# Patient Record
Sex: FEMALE | Race: OTHER | HISPANIC OR LATINO | Employment: UNEMPLOYED | URBAN - METROPOLITAN AREA
[De-identification: names, ages, dates, MRNs, and addresses within clinical notes are randomized per-mention and may not be internally consistent; named-entity substitution may affect disease eponyms.]

---

## 2024-10-27 ENCOUNTER — HOSPITAL ENCOUNTER (EMERGENCY)
Facility: OTHER | Age: 34
Discharge: HOME | End: 2024-10-27
Attending: EMERGENCY MEDICINE

## 2024-10-27 VITALS
OXYGEN SATURATION: 96 % | SYSTOLIC BLOOD PRESSURE: 100 MMHG | TEMPERATURE: 98 F | RESPIRATION RATE: 18 BRPM | DIASTOLIC BLOOD PRESSURE: 59 MMHG | HEART RATE: 61 BPM

## 2024-10-27 DIAGNOSIS — O21.9 NAUSEA/VOMITING IN PREGNANCY: ICD-10-CM

## 2024-10-27 DIAGNOSIS — R10.9 ABDOMINAL PAIN AFFECTING PREGNANCY: ICD-10-CM

## 2024-10-27 DIAGNOSIS — O26.899 ABDOMINAL PAIN AFFECTING PREGNANCY: ICD-10-CM

## 2024-10-27 DIAGNOSIS — R55 NEAR SYNCOPE: Primary | ICD-10-CM

## 2024-10-27 LAB
ABO + RH BLD: NORMAL
ALBUMIN SERPL BCP-MCNC: 4.1 G/DL (ref 3.5–5.2)
ALP SERPL-CCNC: 65 U/L (ref 40–150)
ALT SERPL W/O P-5'-P-CCNC: 11 U/L (ref 10–44)
ANION GAP SERPL CALC-SCNC: 10 MMOL/L (ref 8–16)
AST SERPL-CCNC: 14 U/L (ref 10–40)
B-HCG UR QL: POSITIVE
BASOPHILS # BLD AUTO: 0.02 K/UL (ref 0–0.2)
BASOPHILS NFR BLD: 0.2 % (ref 0–1.9)
BILIRUB SERPL-MCNC: 0.3 MG/DL (ref 0.1–1)
BUN SERPL-MCNC: 7 MG/DL (ref 6–20)
CALCIUM SERPL-MCNC: 9.9 MG/DL (ref 8.7–10.5)
CHLORIDE SERPL-SCNC: 106 MMOL/L (ref 95–110)
CO2 SERPL-SCNC: 20 MMOL/L (ref 23–29)
CREAT SERPL-MCNC: 0.7 MG/DL (ref 0.5–1.4)
CTP QC/QA: YES
DIFFERENTIAL METHOD BLD: ABNORMAL
EOSINOPHIL # BLD AUTO: 0 K/UL (ref 0–0.5)
EOSINOPHIL NFR BLD: 0.3 % (ref 0–8)
ERYTHROCYTE [DISTWIDTH] IN BLOOD BY AUTOMATED COUNT: 13.9 % (ref 11.5–14.5)
EST. GFR  (NO RACE VARIABLE): >60 ML/MIN/1.73 M^2
GLUCOSE SERPL-MCNC: 79 MG/DL (ref 70–110)
HCG INTACT+B SERPL-ACNC: NORMAL MIU/ML
HCT VFR BLD AUTO: 40.2 % (ref 37–48.5)
HGB BLD-MCNC: 13.4 G/DL (ref 12–16)
IMM GRANULOCYTES # BLD AUTO: 0.04 K/UL (ref 0–0.04)
IMM GRANULOCYTES NFR BLD AUTO: 0.4 % (ref 0–0.5)
LYMPHOCYTES # BLD AUTO: 1.4 K/UL (ref 1–4.8)
LYMPHOCYTES NFR BLD: 13.9 % (ref 18–48)
MCH RBC QN AUTO: 27.8 PG (ref 27–31)
MCHC RBC AUTO-ENTMCNC: 33.3 G/DL (ref 32–36)
MCV RBC AUTO: 83 FL (ref 82–98)
MONOCYTES # BLD AUTO: 0.7 K/UL (ref 0.3–1)
MONOCYTES NFR BLD: 6.6 % (ref 4–15)
NEUTROPHILS # BLD AUTO: 7.8 K/UL (ref 1.8–7.7)
NEUTROPHILS NFR BLD: 78.6 % (ref 38–73)
NRBC BLD-RTO: 0 /100 WBC
PLATELET # BLD AUTO: 190 K/UL (ref 150–450)
PMV BLD AUTO: 10.9 FL (ref 9.2–12.9)
POTASSIUM SERPL-SCNC: 4.4 MMOL/L (ref 3.5–5.1)
PROT SERPL-MCNC: 7.8 G/DL (ref 6–8.4)
RBC # BLD AUTO: 4.82 M/UL (ref 4–5.4)
SODIUM SERPL-SCNC: 136 MMOL/L (ref 136–145)
WBC # BLD AUTO: 9.92 K/UL (ref 3.9–12.7)

## 2024-10-27 PROCEDURE — 93010 ELECTROCARDIOGRAM REPORT: CPT | Mod: ,,, | Performed by: INTERNAL MEDICINE

## 2024-10-27 PROCEDURE — 81025 URINE PREGNANCY TEST: CPT | Performed by: NURSE PRACTITIONER

## 2024-10-27 PROCEDURE — 93005 ELECTROCARDIOGRAM TRACING: CPT

## 2024-10-27 PROCEDURE — 86901 BLOOD TYPING SEROLOGIC RH(D): CPT | Performed by: NURSE PRACTITIONER

## 2024-10-27 PROCEDURE — 85025 COMPLETE CBC W/AUTO DIFF WBC: CPT | Performed by: NURSE PRACTITIONER

## 2024-10-27 PROCEDURE — 99285 EMERGENCY DEPT VISIT HI MDM: CPT | Mod: 25

## 2024-10-27 PROCEDURE — 80053 COMPREHEN METABOLIC PANEL: CPT | Performed by: NURSE PRACTITIONER

## 2024-10-27 PROCEDURE — 86900 BLOOD TYPING SEROLOGIC ABO: CPT | Performed by: NURSE PRACTITIONER

## 2024-10-27 PROCEDURE — 84702 CHORIONIC GONADOTROPIN TEST: CPT | Performed by: NURSE PRACTITIONER

## 2024-10-27 PROCEDURE — 25000003 PHARM REV CODE 250: Performed by: NURSE PRACTITIONER

## 2024-10-27 RX ORDER — METOCLOPRAMIDE 10 MG/1
10 TABLET ORAL EVERY 6 HOURS
Qty: 30 TABLET | Refills: 0 | Status: SHIPPED | OUTPATIENT
Start: 2024-10-27

## 2024-10-27 RX ORDER — PROMETHAZINE HYDROCHLORIDE 25 MG/1
25 SUPPOSITORY RECTAL EVERY 6 HOURS PRN
Qty: 10 SUPPOSITORY | Refills: 0 | Status: SHIPPED | OUTPATIENT
Start: 2024-10-27

## 2024-10-27 RX ORDER — PYRIDOXINE HCL (VITAMIN B6) 25 MG
25 TABLET ORAL 2 TIMES DAILY
Qty: 20 TABLET | Refills: 0 | Status: SHIPPED | OUTPATIENT
Start: 2024-10-27

## 2024-10-27 RX ORDER — METOCLOPRAMIDE 10 MG/1
10 TABLET ORAL
Status: COMPLETED | OUTPATIENT
Start: 2024-10-27 | End: 2024-10-27

## 2024-10-27 RX ORDER — DOCUSATE SODIUM 100 MG
600 CAPSULE ORAL
Status: COMPLETED | OUTPATIENT
Start: 2024-10-27 | End: 2024-10-27

## 2024-10-27 RX ADMIN — METOCLOPRAMIDE 10 MG: 10 TABLET ORAL at 03:10

## 2024-10-27 RX ADMIN — Medication 600 ML: at 03:10

## 2024-10-28 LAB
OHS QRS DURATION: 82 MS
OHS QTC CALCULATION: 409 MS

## 2024-12-14 ENCOUNTER — HOSPITAL ENCOUNTER (EMERGENCY)
Facility: OTHER | Age: 34
Discharge: HOME | End: 2024-12-14
Attending: OBSTETRICS & GYNECOLOGY

## 2024-12-14 VITALS
TEMPERATURE: 98 F | RESPIRATION RATE: 17 BRPM | HEART RATE: 73 BPM | SYSTOLIC BLOOD PRESSURE: 92 MMHG | DIASTOLIC BLOOD PRESSURE: 54 MMHG | OXYGEN SATURATION: 99 %

## 2024-12-14 DIAGNOSIS — O26.892 HEARTBURN DURING PREGNANCY, SECOND TRIMESTER: ICD-10-CM

## 2024-12-14 DIAGNOSIS — R51.9 NONINTRACTABLE HEADACHE, UNSPECIFIED CHRONICITY PATTERN, UNSPECIFIED HEADACHE TYPE: Primary | ICD-10-CM

## 2024-12-14 DIAGNOSIS — R11.0 NAUSEA: ICD-10-CM

## 2024-12-14 DIAGNOSIS — Z3A.16 16 WEEKS GESTATION OF PREGNANCY: ICD-10-CM

## 2024-12-14 DIAGNOSIS — R12 HEARTBURN DURING PREGNANCY, SECOND TRIMESTER: ICD-10-CM

## 2024-12-14 LAB
ABO + RH BLD: NORMAL
ALBUMIN SERPL BCP-MCNC: 3.6 G/DL (ref 3.5–5.2)
ALP SERPL-CCNC: 55 U/L (ref 40–150)
ALT SERPL W/O P-5'-P-CCNC: 14 U/L (ref 10–44)
ANION GAP SERPL CALC-SCNC: 10 MMOL/L (ref 8–16)
AST SERPL-CCNC: 16 U/L (ref 10–40)
BASOPHILS # BLD AUTO: 0.02 K/UL (ref 0–0.2)
BASOPHILS NFR BLD: 0.2 % (ref 0–1.9)
BILIRUB SERPL-MCNC: 0.3 MG/DL (ref 0.1–1)
BILIRUBIN, POC UA: NEGATIVE
BLD GP AB SCN CELLS X3 SERPL QL: NORMAL
BLOOD, POC UA: ABNORMAL
BUN SERPL-MCNC: 8 MG/DL (ref 6–20)
CALCIUM SERPL-MCNC: 9.5 MG/DL (ref 8.7–10.5)
CHLORIDE SERPL-SCNC: 107 MMOL/L (ref 95–110)
CLARITY, UA: CLEAR
CO2 SERPL-SCNC: 18 MMOL/L (ref 23–29)
COLOR, UA: YELLOW
CREAT SERPL-MCNC: 0.6 MG/DL (ref 0.5–1.4)
DIFFERENTIAL METHOD BLD: ABNORMAL
EOSINOPHIL # BLD AUTO: 0 K/UL (ref 0–0.5)
EOSINOPHIL NFR BLD: 0.2 % (ref 0–8)
ERYTHROCYTE [DISTWIDTH] IN BLOOD BY AUTOMATED COUNT: 13.8 % (ref 11.5–14.5)
EST. GFR  (NO RACE VARIABLE): >60 ML/MIN/1.73 M^2
GLUCOSE SERPL-MCNC: 83 MG/DL (ref 70–110)
GLUCOSE, POC UA: NEGATIVE
HBV SURFACE AG SERPL QL IA: NORMAL
HCT VFR BLD AUTO: 33.2 % (ref 37–48.5)
HCV AB SERPL QL IA: NEGATIVE
HGB BLD-MCNC: 11.6 G/DL (ref 12–16)
HIV 1+2 AB+HIV1 P24 AG SERPL QL IA: NEGATIVE
IMM GRANULOCYTES # BLD AUTO: 0.03 K/UL (ref 0–0.04)
IMM GRANULOCYTES NFR BLD AUTO: 0.4 % (ref 0–0.5)
KETONES, POC UA: 15 MG/DL
LEUKOCYTE EST, POC UA: NEGATIVE
LYMPHOCYTES # BLD AUTO: 1.1 K/UL (ref 1–4.8)
LYMPHOCYTES NFR BLD: 13.5 % (ref 18–48)
MAGNESIUM SERPL-MCNC: 1.7 MG/DL (ref 1.6–2.6)
MCH RBC QN AUTO: 29.1 PG (ref 27–31)
MCHC RBC AUTO-ENTMCNC: 34.9 G/DL (ref 32–36)
MCV RBC AUTO: 83 FL (ref 82–98)
MONOCYTES # BLD AUTO: 0.5 K/UL (ref 0.3–1)
MONOCYTES NFR BLD: 6.3 % (ref 4–15)
NEUTROPHILS # BLD AUTO: 6.6 K/UL (ref 1.8–7.7)
NEUTROPHILS NFR BLD: 79.4 % (ref 38–73)
NITRITE, POC UA: NEGATIVE
NRBC BLD-RTO: 0 /100 WBC
PH UR STRIP: 6.5 [PH] (ref 5–8)
PHOSPHATE SERPL-MCNC: 4.4 MG/DL (ref 2.7–4.5)
PLATELET # BLD AUTO: 146 K/UL (ref 150–450)
PMV BLD AUTO: 10.6 FL (ref 9.2–12.9)
POTASSIUM SERPL-SCNC: 3.8 MMOL/L (ref 3.5–5.1)
PROT SERPL-MCNC: 7.1 G/DL (ref 6–8.4)
PROTEIN, POC UA: NEGATIVE
RBC # BLD AUTO: 3.99 M/UL (ref 4–5.4)
SODIUM SERPL-SCNC: 135 MMOL/L (ref 136–145)
SPECIFIC GRAVITY, POC UA: 1.01 (ref 1–1.03)
SPECIMEN OUTDATE: NORMAL
TREPONEMA PALLIDUM IGG+IGM AB [PRESENCE] IN SERUM OR PLASMA BY IMMUNOASSAY: NONREACTIVE
UROBILINOGEN, POC UA: 0.2 E.U./DL
WBC # BLD AUTO: 8.25 K/UL (ref 3.9–12.7)

## 2024-12-14 PROCEDURE — 25000003 PHARM REV CODE 250

## 2024-12-14 PROCEDURE — 87591 N.GONORRHOEAE DNA AMP PROB: CPT

## 2024-12-14 PROCEDURE — 87389 HIV-1 AG W/HIV-1&-2 AB AG IA: CPT

## 2024-12-14 PROCEDURE — 83735 ASSAY OF MAGNESIUM: CPT

## 2024-12-14 PROCEDURE — 85025 COMPLETE CBC W/AUTO DIFF WBC: CPT

## 2024-12-14 PROCEDURE — 84100 ASSAY OF PHOSPHORUS: CPT

## 2024-12-14 PROCEDURE — 99284 EMERGENCY DEPT VISIT MOD MDM: CPT | Mod: ,,, | Performed by: OBSTETRICS & GYNECOLOGY

## 2024-12-14 PROCEDURE — 86900 BLOOD TYPING SEROLOGIC ABO: CPT

## 2024-12-14 PROCEDURE — 99284 EMERGENCY DEPT VISIT MOD MDM: CPT | Mod: 25

## 2024-12-14 PROCEDURE — 87340 HEPATITIS B SURFACE AG IA: CPT

## 2024-12-14 PROCEDURE — 86762 RUBELLA ANTIBODY: CPT

## 2024-12-14 PROCEDURE — 80053 COMPREHEN METABOLIC PANEL: CPT

## 2024-12-14 PROCEDURE — 86803 HEPATITIS C AB TEST: CPT

## 2024-12-14 PROCEDURE — 86593 SYPHILIS TEST NON-TREP QUANT: CPT

## 2024-12-14 RX ORDER — DIPHENHYDRAMINE HCL 25 MG
25 CAPSULE ORAL ONCE
Status: COMPLETED | OUTPATIENT
Start: 2024-12-14 | End: 2024-12-14

## 2024-12-14 RX ORDER — ACETAMINOPHEN 500 MG
1000 TABLET ORAL ONCE
Status: COMPLETED | OUTPATIENT
Start: 2024-12-14 | End: 2024-12-14

## 2024-12-14 RX ORDER — PROMETHAZINE HYDROCHLORIDE 25 MG/1
25 TABLET ORAL EVERY 6 HOURS PRN
Qty: 30 TABLET | Refills: 1 | Status: SHIPPED | OUTPATIENT
Start: 2024-12-14

## 2024-12-14 RX ORDER — LANOLIN ALCOHOL/MO/W.PET/CERES
400 CREAM (GRAM) TOPICAL EVERY 4 HOURS
Qty: 2 TABLET | Refills: 0 | Status: SHIPPED | OUTPATIENT
Start: 2024-12-14

## 2024-12-14 RX ORDER — LANOLIN ALCOHOL/MO/W.PET/CERES
50 CREAM (GRAM) TOPICAL DAILY
Qty: 30 TABLET | Refills: 1 | Status: SHIPPED | OUTPATIENT
Start: 2024-12-14

## 2024-12-14 RX ORDER — ONDANSETRON 4 MG/1
4 TABLET, ORALLY DISINTEGRATING ORAL EVERY 8 HOURS PRN
Qty: 30 TABLET | Refills: 0 | Status: CANCELLED | OUTPATIENT
Start: 2024-12-14

## 2024-12-14 RX ORDER — FAMOTIDINE 20 MG/1
20 TABLET, FILM COATED ORAL 2 TIMES DAILY
Qty: 60 TABLET | Refills: 1 | Status: SHIPPED | OUTPATIENT
Start: 2024-12-14 | End: 2025-12-14

## 2024-12-14 RX ORDER — FAMOTIDINE 20 MG/1
20 TABLET, FILM COATED ORAL 2 TIMES DAILY
Qty: 60 TABLET | Refills: 0 | Status: CANCELLED | OUTPATIENT
Start: 2024-12-14 | End: 2025-12-14

## 2024-12-14 RX ORDER — METOCLOPRAMIDE 5 MG/1
10 TABLET ORAL ONCE
Status: COMPLETED | OUTPATIENT
Start: 2024-12-14 | End: 2024-12-14

## 2024-12-14 RX ORDER — FAMOTIDINE 20 MG/1
20 TABLET, FILM COATED ORAL ONCE
Status: COMPLETED | OUTPATIENT
Start: 2024-12-14 | End: 2024-12-14

## 2024-12-14 RX ORDER — LANOLIN ALCOHOL/MO/W.PET/CERES
400 CREAM (GRAM) TOPICAL ONCE
Status: COMPLETED | OUTPATIENT
Start: 2024-12-14 | End: 2024-12-14

## 2024-12-14 RX ADMIN — DIPHENHYDRAMINE HYDROCHLORIDE 25 MG: 25 CAPSULE ORAL at 02:12

## 2024-12-14 RX ADMIN — FAMOTIDINE 20 MG: 20 TABLET, FILM COATED ORAL at 01:12

## 2024-12-14 RX ADMIN — METOCLOPRAMIDE 10 MG: 5 TABLET ORAL at 02:12

## 2024-12-14 RX ADMIN — ACETAMINOPHEN 1000 MG: 500 TABLET, FILM COATED ORAL at 01:12

## 2024-12-14 RX ADMIN — Medication 400 MG: at 03:12

## 2024-12-14 NOTE — ED PROVIDER NOTES
Encounter Date: 2024       History     Chief Complaint   Patient presents with    Nausea     16 week GA c/o N/V, dizziness, HA, pelvic pain x 2 mo. Denies bleeding and discharge. Interpretor #[id number]     Sandi Chavezcandido Garsia is a 34 y.o.  at 16w0d who presents to the OB ED today (2024) with CC of intractable nausea/vomiting and headache.      Pt reports to [facility] with two months of nausea and vomiting, occurring after meals and with laying down, daily. Pt endorses decreased PO intake because of this. Notes that pain is epigastric in nature following eating, food colored/ green vomit in nature. Also endorses regular headaches, which resolve with 200mg tylenol or Excedrin prn but come back. Experiences these headaches 3x a week. Currently endorses 8/10 headache. Otherwise endorses increased abdominal pressure with sneezing, no other complaints at this time. Pt is not currently seeing outpatient OBGYN, however [place] [facility] [place]      She reports - vaginal bleeding, - leakage of fluid, and - contractions.   She reports minimal fetal movement consistent with fetal gestational age.   This pregnancy is complicated by AMA, limited prenatal care.          Review of patient's allergies indicates:  No Known Allergies  No past medical history on file.  No past surgical history on file.  No family history on file.     Review of Systems   Constitutional:  Negative for chills and fever.   HENT:  Negative for congestion, postnasal drip and sore throat.    Eyes:  Negative for visual disturbance.   Respiratory:  Negative for chest tightness and shortness of breath.    Cardiovascular:  Negative for chest pain and palpitations.   Gastrointestinal:  Positive for abdominal pain, nausea and vomiting. Negative for constipation and diarrhea.   Genitourinary:  Negative for dysuria, frequency, vaginal bleeding, vaginal discharge and vaginal pain.   Neurological:  Positive for headaches. Negative for  light-headedness.       Physical Exam     Initial Vitals [12/14/24 1217]   BP Pulse Resp Temp SpO2   116/60 74 16 98.2 °F (36.8 °C) 100 %      MAP       --         Physical Exam    Vitals reviewed.  Constitutional: She appears well-developed and well-nourished. No distress.   HENT:   Head: Normocephalic and atraumatic.   Eyes: Conjunctivae and EOM are normal.   Neck:   Normal range of motion.  Cardiovascular:  Normal rate and regular rhythm.           Pulmonary/Chest: Breath sounds normal. No respiratory distress.   Abdominal: Abdomen is soft. She exhibits no distension. There is no abdominal tenderness. There is no rebound and no guarding.   Genitourinary:    Vagina normal.      No vaginal discharge.     Musculoskeletal:         General: Normal range of motion.      Cervical back: Normal range of motion.     Neurological: She is alert and oriented to person, place, and time.   Skin: Skin is warm and dry.   Psychiatric: She has a normal mood and affect. Thought content normal.         ED Course   Procedures  Labs Reviewed   CBC W/ AUTO DIFFERENTIAL - Abnormal       Result Value    WBC 8.25      RBC 3.99 (*)     Hemoglobin 11.6 (*)     Hematocrit 33.2 (*)     MCV 83      MCH 29.1      MCHC 34.9      RDW 13.8      Platelets 146 (*)     MPV 10.6      Immature Granulocytes 0.4      Gran # (ANC) 6.6      Immature Grans (Abs) 0.03      Lymph # 1.1      Mono # 0.5      Eos # 0.0      Baso # 0.02      nRBC 0      Gran % 79.4 (*)     Lymph % 13.5 (*)     Mono % 6.3      Eosinophil % 0.2      Basophil % 0.2      Differential Method Automated      Narrative:     Release to patient->Immediate   COMPREHENSIVE METABOLIC PANEL - Abnormal    Sodium 135 (*)     Potassium 3.8      Chloride 107      CO2 18 (*)     Glucose 83      BUN 8      Creatinine 0.6      Calcium 9.5      Total Protein 7.1      Albumin 3.6      Total Bilirubin 0.3      Alkaline Phosphatase 55      AST 16      ALT 14      eGFR >60      Anion Gap 10      Narrative:      Release to patient->Immediate   POCT URINALYSIS W/O SCOPE - Abnormal    Spec Grav UA 1.015      PH, UA 6.5      Protein, UA Negative      Glucose, UA Negative      Ketones, UA 15 (*)     Bilirubin, UA Negative      Blood, UA Trace-lysed (*)     Leukocytes, UA Negative      Nitrite, UA Negative      Urobilinogen, UA 0.2      Color, UA POC Yellow      Clarity, UA, POC Clear     MAGNESIUM    Magnesium 1.7      Narrative:     Release to patient->Immediate   PHOSPHORUS    Phosphorus 4.4      Narrative:     Release to patient->Immediate   HEPATITIS B SURFACE ANTIGEN    Hepatitis B Surface Ag Non-reactive      Narrative:     Release to patient->Immediate   HEPATITIS C ANTIBODY    Hepatitis C Ab Negative      Narrative:     Release to patient->Immediate   HIV 1 / 2 ANTIBODY    HIV 1/2 Ag/Ab Negative      Narrative:     Release to patient->Immediate   TREPONEMA PALLIDIUM ANTIBODIES IGG, IGM    Treponema Pallidum Antibodies (IgG, IgM) Nonreactive      Narrative:     Release to patient->Immediate   RUBELLA ANTIBODY, IGG   C. TRACHOMATIS/N. GONORRHOEAE BY AMP DNA   TYPE & SCREEN    Group & Rh A POS      Indirect Rossana NEG      Specimen Outdate 2024 23:59            Imaging Results    None          Medications   acetaminophen tablet 1,000 mg (1,000 mg Oral Given 24 1301)   famotidine tablet 20 mg (20 mg Oral Given 24 1301)   diphenhydrAMINE capsule 25 mg (25 mg Oral Given 24 1428)   metoclopramide HCl tablet 10 mg (10 mg Oral Given 24 1428)   magnesium oxide tablet 400 mg (400 mg Oral Given 24 1503)     Medical Decision Making  Sandi Jacobo Garsia is a 34 y.o.  at 16w0d who presents to the OB ED today (2024) with CC of intractable nausea/vomiting and headache.    Temp:  [98.1 °F (36.7 °C)-98.2 °F (36.8 °C)] 98.1 °F (36.7 °C)  Pulse:  [65-74] 72  Resp:  [16-17] 17  SpO2:  [99 %-100 %] 99 %  BP: (106-116)/(60-64) 106/64    -VSS  -FHT 140s     Nausea/Vomiting   -pt  reports N/V x2 months, occurring daily following meals. Experiences epigastric pain associated with eating and laying down   -PE WNL, as above   -CMP/ Phos WNL, Mag  1.7   -S/p pepcid in [facility]   -s/p mag oxide 400mg in ED for mag repletion   -PO trial passed    -Phenergan, B6, doxylamine, amd magnesium oxide sent to pharmacy. Pt counseled to take doxylamine/B6 nightly, even if not experiencing current symptoms of nausea. Pt in agreement. Pt also counseled regarding taking pepcid prior to meals for optimal gastric acid control     Headache   -pt reports sporadic headaches, not well controlled with 200mg Tylenol or excedrin  -Headache 5/10 following tylenol 1g  -s/p benadryl reglan with resolution of headache  - Advised to avoid NSAIDs    Limited Prenatal care   -T1 labs collected, as above   -FHT 140s   -[facility] clinic messaged to establish care   -Pt counseled regarding calling [facility] to establish care   -Pt scheduled for appointment at [facility] [place] 12/26     Pt deemed stable for discharge with strict return to ED precautions discussed at bedside     Virginie Alston MD (Mary)   Obstetrics and Gynecology, PGY1        Amount and/or Complexity of Data Reviewed  Labs: ordered.    Risk  OTC drugs.  Prescription drug management.              Attending Attestation:   Physician Attestation Statement for Resident:  As the supervising MD   Physician Attestation Statement: I have personally seen and examined this patient.   I agree with the above history.  -:   As the supervising MD I agree with the above PE.     As the supervising MD I agree with the above treatment, course, plan, and disposition.   I was personally present during the critical portions of the procedure(s) performed by the resident and was immediately available in the ED to provide services and assistance as needed during the entire procedure.  I have reviewed and agree with the residents interpretation of the following: lab data.  I have reviewed the following: old records  at this facility.            Attending ED Notes:   I saw and examined the patient myself along with the resident. I have personally reviewed pertinent prior records, labs, imaging, and procedures. I have reviewed the documentation and agree with the findings and plan as documented.      at 16w0d presenting with nausea/vomiting and headache. Nausea/vomiting resolved with Pepcid, tolerated PO. Headache resolved with Tylenol, Reglan/Benadryl. Discussed regimen for nausea/vomiting including nightly B6/Doxylamine, PRN Phenergan, Pepcid AC meals. Discussed Tylenol, small amounts of caffeine, ice packs for headache. Supportive care measures discussed. Discharge home in stable condition. Return precautions discussed.    Mandy Sutton MD FACOG  OB Hospitalist                                 Clinical Impression:  Final diagnoses:  [R11.0] Nausea  [R51.9] Nonintractable headache, unspecified chronicity pattern, unspecified headache type (Primary)  [Z3A.16] 16 weeks gestation of pregnancy  [O26.892, R12] Heartburn during pregnancy, second trimester          ED Disposition Condition    Discharge Stable          ED Prescriptions       Medication Sig Dispense Start Date End Date Auth. Provider    doxylamine succinate 25 mg tablet Take 1 tablet (25 mg total) by mouth every evening. 30 tablet 2024 3/14/2025 Janeen Alston MD    pyridoxine, vitamin B6, (B-6) 50 MG Tab Take 1 tablet (50 mg total) by mouth once daily. 30 tablet 2024 -- Janeen Alston MD    promethazine (PHENERGAN) 25 MG tablet Take 1 tablet (25 mg total) by mouth every 6 (six) hours as needed for Nausea. 30 tablet 2024 -- Janeen Alston MD    famotidine (PEPCID) 20 MG tablet Take 1 tablet (20 mg total) by mouth 2 (two) times daily. 60 tablet 2024 Janeen Alston MD    magnesium oxide (MAG-OX) 400 mg (241.3 mg magnesium) tablet Take 1 tablet (400 mg total) by mouth every 4 (four) hours. 2 tablet 2024 -- Gamaliel  MD Janeen          Follow-up Information       Follow up With Specialties Details Why Contact Info    [facility] - Emergency Dept (Obstetrics) Emergency Medicine  If symptoms worsen [address]  [facility] [id number] [phone]    [facility] Obstetrics [facility] Gynecology  As scheduled, As needed [address]  [facility] [id number] [phone]             Mandy Sutton MD  12/14/24 9290

## 2024-12-15 LAB
C TRACH DNA SPEC QL NAA+PROBE: NOT DETECTED
N GONORRHOEA DNA SPEC QL NAA+PROBE: NOT DETECTED

## 2024-12-16 LAB
RUBV IGG SER-ACNC: 7.1 IU/ML
RUBV IGG SER-IMP: ABNORMAL